# Patient Record
Sex: FEMALE | Race: WHITE | URBAN - METROPOLITAN AREA
[De-identification: names, ages, dates, MRNs, and addresses within clinical notes are randomized per-mention and may not be internally consistent; named-entity substitution may affect disease eponyms.]

---

## 2017-01-16 ENCOUNTER — OFFICE VISIT (OUTPATIENT)
Dept: SURGERY | Facility: CLINIC | Age: 47
End: 2017-01-16
Payer: COMMERCIAL

## 2017-01-16 ENCOUNTER — HOSPITAL ENCOUNTER (OUTPATIENT)
Dept: MAMMOGRAPHY | Facility: CLINIC | Age: 47
End: 2017-01-16
Attending: SURGERY
Payer: COMMERCIAL

## 2017-01-16 ENCOUNTER — HOSPITAL ENCOUNTER (OUTPATIENT)
Dept: MAMMOGRAPHY | Facility: CLINIC | Age: 47
Discharge: HOME | End: 2017-01-16
Attending: SURGERY | Admitting: SURGERY
Payer: COMMERCIAL

## 2017-01-16 VITALS
BODY MASS INDEX: 18.4 KG/M2 | HEART RATE: 74 BPM | HEIGHT: 62 IN | SYSTOLIC BLOOD PRESSURE: 145 MMHG | WEIGHT: 100 LBS | DIASTOLIC BLOOD PRESSURE: 78 MMHG

## 2017-01-16 DIAGNOSIS — N63.14 BREAST LUMP ON RIGHT SIDE AT 4 O'CLOCK POSITION: ICD-10-CM

## 2017-01-16 DIAGNOSIS — N60.19 FIBROCYSTIC BREAST CHANGES, UNSPECIFIED LATERALITY: Primary | ICD-10-CM

## 2017-01-16 DIAGNOSIS — N60.19 FIBROCYSTIC BREAST CHANGES, UNSPECIFIED LATERALITY: ICD-10-CM

## 2017-01-16 PROCEDURE — 99204 OFFICE O/P NEW MOD 45 MIN: CPT | Performed by: SURGERY

## 2017-01-16 PROCEDURE — 76642 ULTRASOUND BREAST LIMITED: CPT | Mod: RT

## 2017-01-16 PROCEDURE — G0204 DX MAMMO INCL CAD BI: HCPCS

## 2017-01-16 NOTE — NURSING NOTE
Breast Patients    BREAST PATIENTS (ALL)    1-Do you have any of the following symptoms? Breast Pain, lump  2-In which breast are you having the symptoms? right   3-Do you use hormones?  No  4-Have you had a Mammogram? Yes  Where: [facility]  Date: Unknown  5-Have you ever had a breast cyst drained? No  6-Have you ever had a breast biopsy? No  7-Have you ever had a Breast Cancer? No   8-Is there a history of Breast Cancer in your family? No  9-Have you ever had Ovarian Cancer? No  10-Is there a history of Ovarian Cancer in your family? No  11-Summarize your daily caffeine intake (i.e. coffee, tea, chocolate, soda etc.): daily    BREAST PATIENTS (FEMALE)    12-What age did your periods begin? 14  13-Date your last menstrual period began? 1/14/2017  14-Number of full-term pregnancies: 0  15-How many children do you have? 0  Ages N/A  15-Your age when your first child was born? N/A  16-Did you nurse your children? N/A  17-Are you pregnant now? No  18-Have you begun menopause? No  19-Have you had either ovary removed?No  20-Do you have breast implants? No   22-Are you on birth control? No  23-What is your marital status?    24-Do you exercise? No   25-What is your occupation? [employer]

## 2017-01-16 NOTE — MR AVS SNAPSHOT
"              After Visit Summary   1/16/2017    Jennifer Pinto    MRN: [id number]           Patient Information     Date Of Birth          1970        Visit Information        Provider Department      1/16/2017 10:00 AM Mellissa Villela MD [facility]      Today's Diagnoses     Fibrocystic breast changes, unspecified laterality    -  1     Breast lump on right side at 4 o'clock position            Follow-ups after your visit        Future tests that were ordered for you today     Open Future Orders        Priority Expected Expires Ordered    MA Diagnostic Digital Bilateral Routine 1/16/2017 1/16/2018 1/16/2017    US Breast Right Limited 1-3 Quadrants Routine 1/16/2017 1/16/2018 1/16/2017            Who to contact     If you have questions or need follow up information about today's clinic visit or your schedule please contact [facility] directly at [phone].  Normal or non-critical lab and imaging results will be communicated to you by MyChart, letter or phone within 4 business days after the clinic has received the results. If you do not hear from us within 7 days, please contact the clinic through [employer] or phone. If you have a critical or abnormal lab result, we will notify you by phone as soon as possible.  Submit refill requests through [employer] or call your pharmacy and they will forward the refill request to us. Please allow 3 business days for your refill to be completed.          Additional Information About Your Visit        [employer] Information     [employer] lets you send messages to your doctor, view your test results, renew your prescriptions, schedule appointments and more. To sign up, go to [email or web address] . Click on \"Log in\" on the left side of the screen, which will take you to the Welcome page. Then click on \"Sign up Now\" on the right side of the page.     You will be asked " "to enter the access code listed below, as well as some personal information. Please follow the directions to create your username and password.     Your access code is: [email or web address]  Expires: 2017 11:11 AM     Your access code will  in 90 days. If you need help or a new code, please call your [place] clinic or [phone].        Care EveryWhere ID     This is your Care EveryWhere ID. This could be used by other organizations to access your [place] medical records  [id number]        Your Vitals Were     Pulse Height BMI (Body Mass Index)             74 5' 2\" (1.575 m) 18.29 kg/m2          Blood Pressure from Last 3 Encounters:   17 145/78    Weight from Last 3 Encounters:   17 100 lb (45.36 kg)               Primary Care Provider Office Phone # Fax #    Delmy Hatch [phone]       [facility] [address] [facility] [address]        Thank you!     Thank you for choosing [facility]  for your care. Our goal is always to provide you with excellent care. Hearing back from our patients is one way we can continue to improve our services. Please take a few minutes to complete the written survey that you may receive in the mail after your visit with us. Thank you!             Your Updated Medication List - Protect others around you: Learn how to safely use, store and throw away your medicines at www.disposemymeds.org.      Notice  As of 2017 11:11 AM    You have not been prescribed any medications.      "

## 2017-01-16 NOTE — PROGRESS NOTES
CHIEF COMPLAINT:  Chief Complaint   Patient presents with     Consult     Lump in right breast        HISTORY OF PRESENT ILLNESS:  Jennifer Pinto is a 46 year old female who is seen in consultation at the request of  Dr. Delmy Hatch for evaluation of a right breast lump and bilateral breaset pain.    Jennifer states that in 2012 she had a lump in her left breast for which she had mammograms and ultrasound and was found to have several cysts in that breast.  She has had no breast imaging since then and no clinical breast exams in about 3 years.  She recently noted a small lump in the lower inner right breast and became concerned.  She saw Dr. Hatch who was able to feel the lump but thought that it had a benign texture and reassured Jennifer.  The lump does not hurt, but is tender to palpation.  She has noted no other breast changes.    Jennifer reached menarche at age 14.  She is  delivering a stillborn child at 24 weeks gestation.  This baby was conceived with the help of infertility treatments.  Jennifer has never had a breast biopsy or cyst aspiration.  She continues to have regular menses and is using no birth control.  Family history is significant for her maternal GM having had breast cancer around age 80 and living to .  There is no FH of ovarian, colon, uterine, prostate or pancreatic cancer.    REVIEW OF SYSTEMS:  Constitutional:  Negative for chills, fatigue, fever and weight change.  Eyes:  Negative for blurred vision, eye pain and photophobia.  ENT:  Negative for hearing problems, ENT pain, congestion, rhinorrhea, epistaxis, hoarseness and dental problems.  Cardiovascular:  Negative for chest pain, palpitations, tachycardia, orthopnea and edema.  Respiratory:  Negative for cough, dyspnea and hemoptysis.  Gastrointestinal:  Negative for abdominal pain, heartburn, constipation, diarrhea and stool changes.  Musculoskeletal:  Negative for arthralgias, back pain and myalgias.  Integumentary/Breast:  See  "HPI.    Past Medical History   Diagnosis Date     Infertility, female      Lyme disease        Past Surgical History   Procedure Laterality Date     Lyme disease gee with reflex to wb serum         Family History   Problem Relation Age of Onset     Prostate Cancer Father      Breast Cancer Maternal Grandmother 80     Other Cancer Paternal Grandmother        Social History   Substance Use Topics     Smoking status: Never Smoker      Smokeless tobacco: Not on file     Alcohol Use: No       There is no problem list on file for this patient.    Allergies   Allergen Reactions     Penicillins      No current outpatient prescriptions on file.     Vitals: /78 mmHg  Pulse 74  Ht 5' 2\" (1.575 m)  Wt 100 lb (45.36 kg)  BMI 18.29 kg/m2  BMI= Body mass index is 18.29 kg/(m^2).    EXAM:  GENERAL: healthy, alert and no distress   BREAST:  The breasts are very small and appear symmetric with no overlying skin changes.  The nipples are normal bilaterally.  There is no dimpling or thickening of the skin.  No mass is appreciated in either breast.  A soft, but very tender, cyst is palpated in the left breast at 4 o'clock.  This cyst is about 1.5 cm.  The breast tissue is very dense in both upper outer breasts.  A tiny lump is appreciated in the right breast at 4 o'clock far medial.  A second, even smaller, lump may be present as well just lateral to the other one.  Both of these lumps feel like cysts and are smooth, mobile and tender to palpation.  A few tiny BB-sized lumps are palpated at 8 o'clock in the right breast as well.    There is no axillary or supraclavicular lymphadenopathy.  CARDIOVASCULAR:  No edema or JVD.  RESPIRATORY: negative findings: no chest deformities noted, no chest wall tenderness, breathing is unlabored.  NECK: Neck supple. No adenopathy.   SKIN: No suspicious lesions or rashes  LYMPH: Normal cervical lymph nodes    ASSESSMENT:  Jennifer Pinto has a small mass in the lower inner right breast that " feels like a cyst or a small fibroadenoma.  It is smooth and mobile, benign characteristics.  Jennifer also has fibrocystic breast tissue.  I recommended that she have diagnostic imaging to further assess the mass.  After a discussion about the amount of radiation from mammograms and the safety of mammograms, Jennifer agreed to have the imaging.    Diagnostic mammograms and right breast ultrasound were performed.  The mammograms with tomosynthesis show very dense breast tissue with no clear abnormality.  Ultrasound of the palpable right breast lump showed a small, smoothly marignated, oval, solid mass measuring about 1 cm.  The radiologist thought that this mass had the appearance of a fibroadenoma and recommended 6 month follow up.    PLAN:  Jennifer will return in 6 months for a follow up ultrasound.  If the mass remains unchanged then she will be encouraged to continue with routine annual screening to include annual mammograms and annual clinical breast exams.    Total time with patient visit: 45 minutes including discussions about the plan of care and care coordination with the patient.    Mellissa Villela MD    Please route or send letter to:  Primary Care Provider (PCP)      HPI      ROS      Physical Exam

## 2017-01-16 NOTE — Clinical Note
2017      RE:  Jennifer Pinto-: 68     HISTORY OF PRESENT ILLNESS:  Jennifer Pinto is a 46 year old female who is seen in consultation at the request of  Dr. Delmy Hatch for evaluation of a right breast lump and bilateral breaset pain.    Jennifer states that in 2012 she had a lump in her left breast for which she had mammograms and ultrasound and was found to have several cysts in that breast.  She has had no breast imaging since then and no clinical breast exams in about 3 years.  She recently noted a small lump in the lower inner right breast and became concerned.  She saw Dr. Hatch who was able to feel the lump but thought that it had a benign texture and reassured Jennifer.  The lump does not hurt, but is tender to palpation.  She has noted no other breast changes.    Jennifer reached menarche at age 14.  She is  delivering a stillborn child at 24 weeks gestation.  This baby was conceived with the help of infertility treatments.  Jennifer has never had a breast biopsy or cyst aspiration.  She continues to have regular menses and is using no birth control.  Family history is significant for her maternal GM having had breast cancer around age 79 and living to .  There is no FH of ovarian, colon, uterine, prostate or pancreatic cancer.    REVIEW OF SYSTEMS:  Constitutional:  Negative for chills, fatigue, fever and weight change.  Eyes:  Negative for blurred vision, eye pain and photophobia.  ENT:  Negative for hearing problems, ENT pain, congestion, rhinorrhea, epistaxis, hoarseness and dental problems.  Cardiovascular:  Negative for chest pain, palpitations, tachycardia, orthopnea and edema.  Respiratory:  Negative for cough, dyspnea and hemoptysis.  Gastrointestinal:  Negative for abdominal pain, heartburn, constipation, diarrhea and stool changes.  Musculoskeletal:  Negative for arthralgias, back pain and myalgias.  Integumentary/Breast:  See HPI.    Vitals: /78 mmHg  Pulse 74  Ht 5'  "2\" (1.575 m)  Wt 100 lb (45.36 kg)  BMI 18.29 kg/m2  BMI= Body mass index is 18.29 kg/(m^2).    EXAM:  GENERAL: healthy, alert and no distress   BREAST:  The breasts are very small and appear symmetric with no overlying skin changes.  The nipples are normal bilaterally.  There is no dimpling or thickening of the skin.  No mass is appreciated in either breast.  A soft, but very tender, cyst is palpated in the left breast at 4 o'clock.  This cyst is about 1.5 cm.  The breast tissue is very dense in both upper outer breasts.  A tiny lump is appreciated in the right breast at 4 o'clock far medial.  A second, even smaller, lump may be present as well just lateral to the other one.  Both of these lumps feel like cysts and are smooth, mobile and tender to palpation.  A few tiny BB-sized lumps are palpated at 8 o'clock in the right breast as well.    There is no axillary or supraclavicular lymphadenopathy.  CARDIOVASCULAR:  No edema or JVD.  RESPIRATORY: negative findings: no chest deformities noted, no chest wall tenderness, breathing is unlabored.  NECK: Neck supple. No adenopathy.   SKIN: No suspicious lesions or rashes  LYMPH: Normal cervical lymph nodes    ASSESSMENT:  Jennifer Pinto has a small mass in the lower inner right breast that feels like a cyst or a small fibroadenoma.  It is smooth and mobile, benign characteristics.  Jennifer also has fibrocystic breast tissue.  I recommended that she have diagnostic imaging to further assess the mass.  After a discussion about the amount of radiation from mammograms and the safety of mammograms, Jennifer agreed to have the imaging.    Diagnostic mammograms and right breast ultrasound were performed.  The mammograms with tomosynthesis show very dense breast tissue with no clear abnormality.  Ultrasound of the palpable right breast lump showed a small, smoothly marignated, oval, solid mass measuring about 1 cm.  The radiologist thought that this mass had the appearance of a " fibroadenoma and recommended 6 month follow up.    PLAN:  Jennifer will return in 6 months for a follow up ultrasound.  If the mass remains unchanged then she will be encouraged to continue with routine annual screening to include annual mammograms and annual clinical breast exams.        Mellissa Villela MD

## 2017-08-23 ENCOUNTER — TELEPHONE (OUTPATIENT)
Dept: SURGERY | Facility: CLINIC | Age: 47
End: 2017-08-23

## 2017-08-23 NOTE — TELEPHONE ENCOUNTER
Called patient to let her know that Dr. Villela still is recommending that she has 6 month US of her breast for follow-up on a breast mass. No answer, LM with recommendations and provided [facility] ph.# to schedule and clinic call back ph.# is she had further questions.    Francisca Perera RN BSN

## 2017-09-08 ENCOUNTER — HOSPITAL ENCOUNTER (OUTPATIENT)
Dept: MAMMOGRAPHY | Facility: CLINIC | Age: 47
Discharge: HOME | End: 2017-09-08
Attending: SURGERY | Admitting: SURGERY
Payer: COMMERCIAL

## 2017-09-08 DIAGNOSIS — N63.10 BREAST MASS, RIGHT: ICD-10-CM

## 2017-09-08 PROCEDURE — 76642 ULTRASOUND BREAST LIMITED: CPT | Mod: RT

## 2018-05-03 ENCOUNTER — HOSPITAL ENCOUNTER (OUTPATIENT)
Dept: MAMMOGRAPHY | Facility: CLINIC | Age: 48
End: 2018-05-03
Attending: SURGERY
Payer: COMMERCIAL

## 2018-05-03 ENCOUNTER — HOSPITAL ENCOUNTER (OUTPATIENT)
Dept: MAMMOGRAPHY | Facility: CLINIC | Age: 48
Discharge: HOME | End: 2018-05-03
Attending: SURGERY | Admitting: SURGERY
Payer: COMMERCIAL

## 2018-05-03 DIAGNOSIS — N63.10 BREAST MASS, RIGHT: ICD-10-CM

## 2018-05-03 PROCEDURE — G0279 TOMOSYNTHESIS, MAMMO: HCPCS

## 2018-05-03 PROCEDURE — 76642 ULTRASOUND BREAST LIMITED: CPT | Mod: RT
